# Patient Record
Sex: FEMALE | Race: WHITE | Employment: FULL TIME | URBAN - METROPOLITAN AREA
[De-identification: names, ages, dates, MRNs, and addresses within clinical notes are randomized per-mention and may not be internally consistent; named-entity substitution may affect disease eponyms.]

---

## 2017-05-16 ENCOUNTER — OFFICE VISIT (OUTPATIENT)
Dept: OBGYN CLINIC | Facility: CLINIC | Age: 57
End: 2017-05-16

## 2017-05-16 VITALS
SYSTOLIC BLOOD PRESSURE: 102 MMHG | DIASTOLIC BLOOD PRESSURE: 80 MMHG | HEIGHT: 63 IN | BODY MASS INDEX: 24.1 KG/M2 | WEIGHT: 136 LBS

## 2017-05-16 DIAGNOSIS — N39.0 URINARY TRACT INFECTION, SITE UNSPECIFIED: Primary | ICD-10-CM

## 2017-05-16 DIAGNOSIS — N95.2 ATROPHIC VAGINITIS: ICD-10-CM

## 2017-05-16 DIAGNOSIS — N95.1 MENOPAUSAL SYMPTOMS: ICD-10-CM

## 2017-05-16 PROCEDURE — 87186 SC STD MICRODIL/AGAR DIL: CPT | Performed by: OBSTETRICS & GYNECOLOGY

## 2017-05-16 PROCEDURE — 99213 OFFICE O/P EST LOW 20 MIN: CPT | Performed by: OBSTETRICS & GYNECOLOGY

## 2017-05-16 PROCEDURE — 81002 URINALYSIS NONAUTO W/O SCOPE: CPT | Performed by: OBSTETRICS & GYNECOLOGY

## 2017-05-16 PROCEDURE — 87077 CULTURE AEROBIC IDENTIFY: CPT | Performed by: OBSTETRICS & GYNECOLOGY

## 2017-05-16 PROCEDURE — 87086 URINE CULTURE/COLONY COUNT: CPT | Performed by: OBSTETRICS & GYNECOLOGY

## 2017-05-16 PROCEDURE — 81001 URINALYSIS AUTO W/SCOPE: CPT | Performed by: OBSTETRICS & GYNECOLOGY

## 2017-05-16 RX ORDER — SULFAMETHOXAZOLE AND TRIMETHOPRIM 800; 160 MG/1; MG/1
1 TABLET ORAL 2 TIMES DAILY
Qty: 6 TABLET | Refills: 0 | Status: SHIPPED | OUTPATIENT
Start: 2017-05-16 | End: 2017-05-26

## 2017-05-16 RX ORDER — PAROXETINE 7.5 MG/1
1 CAPSULE ORAL DAILY
Qty: 90 CAPSULE | Refills: 3 | Status: SHIPPED | OUTPATIENT
Start: 2017-05-16 | End: 2017-06-15

## 2017-05-16 RX ORDER — ESTRADIOL 0.1 MG/G
1 CREAM VAGINAL EVERY OTHER DAY
Qty: 1 TUBE | Refills: 0 | Status: SHIPPED | OUTPATIENT
Start: 2017-05-16 | End: 2017-10-09

## 2017-05-16 NOTE — PROGRESS NOTES
GYNECOLOGY RETURN VISIT          2017  10:23 AM    CC:Patient presents with possible UTI.    HPI: Patient is a 65year old  No LMP recorded (exact date).  Patient is postmenopausal. who presents with 3 day h/o urinary frequency, urinary buring, Other Topics Concern    Blood Transfusions No     Social History Narrative    NO HISTORY OF SEXUAL ABUSE     MO HISTORY OF DOMESTIC ABUSE            Review of Systems:    See above HPI.       /80 mmHg  Ht 63\"  Wt 136 lb  BMI 24.10 kg/m2  LMP  (Ex

## 2017-06-07 ENCOUNTER — NURSE ONLY (OUTPATIENT)
Dept: OBGYN CLINIC | Facility: CLINIC | Age: 57
End: 2017-06-07

## 2017-06-07 DIAGNOSIS — N39.0 URINARY TRACT INFECTION, SITE UNSPECIFIED: Primary | ICD-10-CM

## 2017-06-07 PROCEDURE — 81001 URINALYSIS AUTO W/SCOPE: CPT | Performed by: OBSTETRICS & GYNECOLOGY

## 2017-06-07 PROCEDURE — 81002 URINALYSIS NONAUTO W/O SCOPE: CPT | Performed by: OBSTETRICS & GYNECOLOGY

## 2017-06-07 PROCEDURE — 87186 SC STD MICRODIL/AGAR DIL: CPT | Performed by: OBSTETRICS & GYNECOLOGY

## 2017-06-07 PROCEDURE — 87086 URINE CULTURE/COLONY COUNT: CPT | Performed by: OBSTETRICS & GYNECOLOGY

## 2017-06-07 PROCEDURE — 87077 CULTURE AEROBIC IDENTIFY: CPT | Performed by: OBSTETRICS & GYNECOLOGY

## 2017-06-07 RX ORDER — CIPROFLOXACIN 500 MG/1
500 TABLET, FILM COATED ORAL 2 TIMES DAILY
Qty: 14 TABLET | Refills: 0 | Status: SHIPPED | OUTPATIENT
Start: 2017-06-07 | End: 2017-06-14

## 2017-06-07 NOTE — PROGRESS NOTES
Naga Ku is here for NV for frequent and burning with urination. In office urine dip shows + ramón/blood. UA and C&S sent to the lab. Per Dr Germán Nava ok to treat pt with Cipro 500 mg BID x 7 days. Rx sent to pharmacy. Pt c/o frequent UTI.  Pt advised to make rozina

## 2017-06-16 ENCOUNTER — NURSE ONLY (OUTPATIENT)
Dept: OBGYN CLINIC | Facility: CLINIC | Age: 57
End: 2017-06-16

## 2017-06-16 DIAGNOSIS — N39.0 URINARY TRACT INFECTION, SITE UNSPECIFIED: Primary | ICD-10-CM

## 2017-06-16 PROCEDURE — 81003 URINALYSIS AUTO W/O SCOPE: CPT | Performed by: OBSTETRICS & GYNECOLOGY

## 2017-06-16 PROCEDURE — 87086 URINE CULTURE/COLONY COUNT: CPT | Performed by: OBSTETRICS & GYNECOLOGY

## 2017-09-27 ENCOUNTER — OFFICE VISIT (OUTPATIENT)
Dept: OBGYN CLINIC | Facility: CLINIC | Age: 57
End: 2017-09-27

## 2017-09-27 VITALS
BODY MASS INDEX: 24.66 KG/M2 | HEIGHT: 63 IN | SYSTOLIC BLOOD PRESSURE: 116 MMHG | DIASTOLIC BLOOD PRESSURE: 70 MMHG | WEIGHT: 139.19 LBS

## 2017-09-27 DIAGNOSIS — Z01.419 WOMEN'S ANNUAL ROUTINE GYNECOLOGICAL EXAMINATION: Primary | ICD-10-CM

## 2017-09-27 DIAGNOSIS — N36.1 URETHRAL DIVERTICULUM: ICD-10-CM

## 2017-09-27 PROCEDURE — 99396 PREV VISIT EST AGE 40-64: CPT | Performed by: OBSTETRICS & GYNECOLOGY

## 2017-09-27 NOTE — PROGRESS NOTES
GYN ANNUAL    2017  1:56 PM    CC:Patient presents for routine visit    HPI: Patient is a 65year old  LMP 2013 here for annual gyn exam, due for mammogram.  Recent UTI treated which patient reports as recurring more frequently this past year. education: N/A  Number of children: 4     Occupational History  MANAGER       Social History Main Topics   Smoking status: Never Smoker    Smokeless tobacco: Never Used    Alcohol use No    Drug use: No    Sexual activity: Yes    Birth control/ protection: gynecological examination    2. Urethral diverticulum          1. Women's annual routine gynecological examination  - Mammogram ordered, to be done at [facility]    2.  Urethral diverticulum with recurrent UTI history  - Referral and phone given for Urology

## 2017-10-09 DIAGNOSIS — N95.2 ATROPHIC VAGINITIS: ICD-10-CM

## 2017-10-09 RX ORDER — ESTRADIOL 0.1 MG/G
CREAM VAGINAL
Qty: 42.5 G | Refills: 0 | Status: SHIPPED | OUTPATIENT
Start: 2017-10-09

## 2017-10-16 ENCOUNTER — TELEPHONE (OUTPATIENT)
Dept: OBGYN CLINIC | Facility: CLINIC | Age: 57
End: 2017-10-16

## 2017-10-16 NOTE — TELEPHONE ENCOUNTER
Fax rec'd from [facility] that signature is required on mammogram order. Order signed and faxed back to [phone], [facility] radiology dept.

## (undated) NOTE — MR AVS SNAPSHOT
[address] at [place] [address]  [phone]               Thank you for choosing us for your health care visit with [address] [facility].   We are glad to serve you and happy to provide you Visit [facility] online at  [email or web address]

## (undated) NOTE — MR AVS SNAPSHOT
[address] at [address], [place] [address]  [phone]               Thank you for choosing us for your health care visit with Zeferino Pastrana MD.  We are glad to serve you and happy to pippa Take 1 tablet by mouth 2 (two) times daily.    Commonly known as:  BACTRIM DS                Where to Get Your Medications      These medications were sent to [address], [address] [facility] [address], [phone] [email or web address]

## (undated) NOTE — MR AVS SNAPSHOT
[address] at [place] [address]  [phone]               Thank you for choosing us for your health care visit with [address] [facility].   We are glad to serve you and happy to provide you - Ciprofloxacin HCl 500 MG Tabs            Results of Recent Testing     URINALYSIS NONAUTO W/O SCOPE      Component Value Standard Range & Units    GLUCOSE (URINE DIPSTICK) Neg Negative mg/dL    BILIRUBIN Neg Negative    KETONES (URINE DIPSTICK) Neg Nega